# Patient Record
Sex: FEMALE | Race: WHITE | NOT HISPANIC OR LATINO | URBAN - METROPOLITAN AREA
[De-identification: names, ages, dates, MRNs, and addresses within clinical notes are randomized per-mention and may not be internally consistent; named-entity substitution may affect disease eponyms.]

---

## 2019-01-17 ENCOUNTER — APPOINTMENT (EMERGENCY)
Dept: RADIOLOGY | Facility: HOSPITAL | Age: 39
End: 2019-01-17
Attending: EMERGENCY MEDICINE
Payer: COMMERCIAL

## 2019-01-17 ENCOUNTER — HOSPITAL ENCOUNTER (EMERGENCY)
Facility: HOSPITAL | Age: 39
Discharge: HOME | End: 2019-01-17
Attending: EMERGENCY MEDICINE
Payer: COMMERCIAL

## 2019-01-17 VITALS
OXYGEN SATURATION: 98 % | SYSTOLIC BLOOD PRESSURE: 214 MMHG | TEMPERATURE: 98.7 F | RESPIRATION RATE: 18 BRPM | DIASTOLIC BLOOD PRESSURE: 104 MMHG | HEART RATE: 102 BPM

## 2019-01-17 DIAGNOSIS — E87.6 HYPOKALEMIA: ICD-10-CM

## 2019-01-17 DIAGNOSIS — R06.02 SHORTNESS OF BREATH: Primary | ICD-10-CM

## 2019-01-17 LAB
ANION GAP SERPL CALC-SCNC: 11 MEQ/L (ref 3–15)
BASOPHILS # BLD: 0.05 K/UL (ref 0.01–0.1)
BASOPHILS NFR BLD: 0.6 %
BUN SERPL-MCNC: 12 MG/DL (ref 8–20)
CALCIUM SERPL-MCNC: 8.8 MG/DL (ref 8.9–10.3)
CHLORIDE SERPL-SCNC: 105 MEQ/L (ref 98–109)
CO2 SERPL-SCNC: 20 MEQ/L (ref 22–32)
CREAT SERPL-MCNC: 0.8 MG/DL
D DIMER PPP IA.FEU-MCNC: 0.3 UG/ML FEU (ref 0–0.5)
DIFFERENTIAL METHOD BLD: ABNORMAL
EOSINOPHIL # BLD: 0.06 K/UL (ref 0.04–0.36)
EOSINOPHIL NFR BLD: 0.7 %
ERYTHROCYTE [DISTWIDTH] IN BLOOD BY AUTOMATED COUNT: 11.7 % (ref 11.7–14.4)
GFR SERPL CREATININE-BSD FRML MDRD: >60 ML/MIN/1.73M*2
GLUCOSE SERPL-MCNC: 102 MG/DL (ref 70–99)
HCG UR QL: NEGATIVE
HCT VFR BLDCO AUTO: 40.5 %
HGB BLD-MCNC: 14.6 G/DL
IMM GRANULOCYTES # BLD AUTO: 0.03 K/UL (ref 0–0.08)
IMM GRANULOCYTES NFR BLD AUTO: 0.4 %
LYMPHOCYTES # BLD: 2.17 K/UL (ref 1.2–3.5)
LYMPHOCYTES NFR BLD: 26.5 %
MCH RBC QN AUTO: 30.5 PG (ref 28–33.2)
MCHC RBC AUTO-ENTMCNC: 36 G/DL (ref 32.2–35.5)
MCV RBC AUTO: 84.7 FL (ref 83–98)
MONOCYTES # BLD: 0.66 K/UL (ref 0.28–0.8)
MONOCYTES NFR BLD: 8.1 %
NEUTROPHILS # BLD: 5.22 K/UL (ref 1.7–7)
NEUTS SEG NFR BLD: 63.7 %
NRBC BLD-RTO: 0.1 %
PDW BLD AUTO: 10.1 FL (ref 9.4–12.3)
PLATELET # BLD AUTO: 300 K/UL
POTASSIUM SERPL-SCNC: 3.1 MEQ/L (ref 3.6–5.1)
RBC # BLD AUTO: 4.78 M/UL (ref 3.93–5.22)
SODIUM SERPL-SCNC: 136 MEQ/L (ref 136–144)
TROPONIN I SERPL-MCNC: <0.02 NG/ML
WBC # BLD AUTO: 8.19 K/UL

## 2019-01-17 PROCEDURE — 93005 ELECTROCARDIOGRAM TRACING: CPT | Performed by: PHYSICIAN ASSISTANT

## 2019-01-17 PROCEDURE — 63700000 HC SELF-ADMINISTRABLE DRUG: Performed by: PHYSICIAN ASSISTANT

## 2019-01-17 PROCEDURE — 84484 ASSAY OF TROPONIN QUANT: CPT | Performed by: PHYSICIAN ASSISTANT

## 2019-01-17 PROCEDURE — 71046 X-RAY EXAM CHEST 2 VIEWS: CPT

## 2019-01-17 PROCEDURE — 36415 COLL VENOUS BLD VENIPUNCTURE: CPT | Performed by: PHYSICIAN ASSISTANT

## 2019-01-17 PROCEDURE — 80048 BASIC METABOLIC PNL TOTAL CA: CPT | Performed by: PHYSICIAN ASSISTANT

## 2019-01-17 PROCEDURE — 84703 CHORIONIC GONADOTROPIN ASSAY: CPT | Performed by: PHYSICIAN ASSISTANT

## 2019-01-17 PROCEDURE — 85379 FIBRIN DEGRADATION QUANT: CPT | Performed by: PHYSICIAN ASSISTANT

## 2019-01-17 PROCEDURE — 85025 COMPLETE CBC W/AUTO DIFF WBC: CPT | Performed by: PHYSICIAN ASSISTANT

## 2019-01-17 PROCEDURE — 99284 EMERGENCY DEPT VISIT MOD MDM: CPT | Mod: 25

## 2019-01-17 RX ORDER — ACETAMINOPHEN 500 MG
5000 TABLET ORAL DAILY
COMMUNITY

## 2019-01-17 RX ORDER — BUPROPION HYDROCHLORIDE 300 MG/1
300 TABLET ORAL DAILY
COMMUNITY

## 2019-01-17 RX ORDER — DEXTROAMPHETAMINE SACCHARATE, AMPHETAMINE ASPARTATE, DEXTROAMPHETAMINE SULFATE AND AMPHETAMINE SULFATE 2.5; 2.5; 2.5; 2.5 MG/1; MG/1; MG/1; MG/1
15 TABLET ORAL 2 TIMES DAILY
COMMUNITY

## 2019-01-17 RX ORDER — POTASSIUM CHLORIDE 750 MG/1
40 TABLET, FILM COATED, EXTENDED RELEASE ORAL ONCE
Status: COMPLETED | OUTPATIENT
Start: 2019-01-17 | End: 2019-01-17

## 2019-01-17 RX ADMIN — POTASSIUM CHLORIDE 40 MEQ: 750 TABLET, FILM COATED, EXTENDED RELEASE ORAL at 18:11

## 2019-01-17 ASSESSMENT — ENCOUNTER SYMPTOMS
PALPITATIONS: 0
BACK PAIN: 0
SHORTNESS OF BREATH: 1
FREQUENCY: 0
NECK PAIN: 0
DYSURIA: 0
VOMITING: 0
ABDOMINAL PAIN: 0
CHILLS: 1
FEVER: 0
HEADACHES: 0
DIARRHEA: 0
APPETITE CHANGE: 0
COUGH: 1
CONSTIPATION: 0
HEMATURIA: 0
NAUSEA: 0
DIFFICULTY URINATING: 0
DIZZINESS: 0
WEAKNESS: 0
LIGHT-HEADEDNESS: 0

## 2019-01-17 NOTE — ED PROVIDER NOTES
"HPI     Chief Complaint   Patient presents with   • Shortness of Breath       Patient is a 38-year-old female past medical history of ADD presenting with chief complaint of shortness of breath.  Patient states over the last 4-5 days she has been having a dry cough, feeling as if she needs to get something up however not only a small amount of yellow mucus comes up.  Patient admits to intermittent chills.  Patient states the last 2 days the shortness of breath has been causing her some current concern because she feels that it is \"hard to get a breath out\".  Patient initially thought it may be due to anxiety however admits she is feeling very happy lately and recently got a raise at work.  Patient denies back pain.  Patient also notes intermittent chest discomfort over the last couple of days.  Patient states she is concerned because her blood pressure has been a little high recently (as high as 160/110) she has taken it at a supermarket and that her PCPs office. The patient admits to OCP use with estrogen.  Patient denies history of recent travel, family or personal history of DVT/PE, recent surgery or trauma.  The patient denies calf pain or swelling.  The patient denies history of asthma or pneumonia.        History provided by:  Patient and spouse       Patient History     Past Medical History:   Diagnosis Date   • ADD (attention deficit disorder)        Past Surgical History:   Procedure Laterality Date   • ADENOIDECTOMY         History reviewed. No pertinent family history.    Social History   Substance Use Topics   • Smoking status: Never Smoker   • Smokeless tobacco: Never Used   • Alcohol use Yes      Comment: social       Systems Reviewed from Nursing Triage:          Review of Systems     Review of Systems   Constitutional: Positive for chills. Negative for appetite change and fever.   HENT: Negative for congestion.    Respiratory: Positive for cough and shortness of breath.    Cardiovascular: Positive for " chest pain. Negative for palpitations and leg swelling.   Gastrointestinal: Negative for abdominal pain, constipation, diarrhea, nausea and vomiting.   Genitourinary: Negative for decreased urine volume, difficulty urinating, dysuria, frequency, hematuria and urgency.   Musculoskeletal: Negative for back pain and neck pain.   Neurological: Negative for dizziness, weakness, light-headedness and headaches.        Physical Exam     ED Triage Vitals   Temp Heart Rate Resp BP SpO2   01/17/19 1346 01/17/19 1346 01/17/19 1635 01/17/19 1346 01/17/19 1346   37.1 °C (98.7 °F) (!) 102 18 (!) 164/90 99 %      Temp Source Heart Rate Source Patient Position BP Location FiO2 (%) (Set)   01/17/19 1346 01/17/19 1346 01/17/19 1346 01/17/19 1346 --   Tympanic Monitor Sitting Left upper arm                      Patient Vitals for the past 24 hrs:   BP Temp Temp src Pulse Resp SpO2   01/17/19 1635 (!) 195/109 - - - 18 98 %   01/17/19 1346 (!) 164/90 37.1 °C (98.7 °F) Tympanic (!) 102 - 99 %           Physical Exam   Constitutional: She is oriented to person, place, and time. She appears well-developed and well-nourished.  Non-toxic appearance. No distress.   HENT:   Head: Normocephalic and atraumatic.   Eyes: Conjunctivae and EOM are normal. Pupils are equal, round, and reactive to light.   Neck: Normal range of motion.   Cardiovascular: Regular rhythm.  Tachycardia present.    No murmur heard.  Pulmonary/Chest: Effort normal and breath sounds normal. No respiratory distress. She has no wheezes.   Abdominal: Soft. She exhibits no distension. There is no tenderness.   Musculoskeletal:        Right lower leg: She exhibits no swelling and no edema.        Left lower leg: She exhibits no swelling and no edema.   Moving all extremities without difficulty.   Neurological: She is alert and oriented to person, place, and time.   Skin: Skin is warm and dry.   Psychiatric: Her mood appears anxious.   Nursing note and vitals reviewed.            Procedures    ED Course & MDM     Labs Reviewed   BASIC METABOLIC PANEL - Abnormal        Result Value    Sodium 136      Potassium 3.1 (*)     Chloride 105      CO2 20 (*)     BUN 12      Creatinine 0.8      Glucose 102 (*)     Calcium 8.8 (*)     eGFR >60.0      Anion Gap 11     CBC - Abnormal     WBC 8.19      RBC 4.78      Hemoglobin 14.6      Hematocrit 40.5      MCV 84.7      MCH 30.5      MCHC 36.0 (*)     RDW 11.7      Platelets 300      MPV 10.1     DIFF COUNT - Abnormal     Differential Type Auto      nRBC 0.1 (*)     Immature Granulocytes 0.4      Neutrophils 63.7      Lymphocytes 26.5      Monocytes 8.1      Eosinophils 0.7      Basophils 0.6      Immature Granulocytes, Absolute 0.03      Neutrophils, Absolute 5.22      Lymphocytes, Absolute 2.17      Monocytes, Absolute 0.66      Eosinophils, Absolute 0.06      Basophils, Absolute 0.05     BHCG, SERUM, QUAL - Normal    Preg Test, Serum Negative     D-DIMER - Normal    D-Dimer, Quant 0.30     TROPONIN I - Normal    Troponin I <0.02     CBC AND DIFFERENTIAL    Narrative:     The following orders were created for panel order CBC and Differential.  Procedure                               Abnormality         Status                     ---------                               -----------         ------                     CBC[[id number]]                           Abnormal            Final result               Diff Count[[id number]]                    Abnormal            Final result                 Please view results for these tests on the individual orders.       ECG 12 lead   ED Interpretation   Regular rate at 85 bpm, normal sinus rhythm,  ms, QRS 82 ms, QT/QTc 404/480 ms, normal axis, ST/T unremarkable            X-RAY CHEST 2 VIEWS   Final Result   IMPRESSION: No active disease of the chest.                  [facility]         ED Course as of Jan 17 1802   Thu Jan 17, 2019   1622 Labs, CXR, EKG  [KM]   6833 Discussed with Dr. Rousseau  [KM]   7190 Will order  PO prior to d/c Potassium: (!) 3.1 [KM]   1750 Discussed with Dr. Rousseau. Pt can be d/c home. Pt was offered BP meds to be started due to HTN here however declines. Pt may be d/c and agrees to f/u with her PCP tomorrow.  [KM]   1757 Pt updated on all results, agrees to call her PCP tomorrow to be seen in the office.  [KM]      ED Course User Index  [KM] Kusum Carrizales PA C         Clinical Impressions as of Jan 17 1802   Shortness of breath   Hypokalemia     Dispo:  Discharge     Kusum Carrizales PA C  01/17/19 1802

## 2019-01-17 NOTE — DISCHARGE INSTRUCTIONS
You're not diagnosed with hypertension, however your blood pressure was elevated in the ER.  You must follow-up with your PCP tomorrow 1/18 regarding ER visit and elevated blood pressure.    Return to the ER if you experience worsening of symptoms, develop headache, have difficulty urinating, are unable to tolerate oral intake, develop fevers/ chills, or develop any other new concerns.

## 2019-01-18 NOTE — ED ATTESTATION NOTE
I have personally seen and examined the patient.  I reviewed and agree with physician assistant / nurse practitioner’s assessment and plan of care, with the following exceptions: None  My examination, assessment, and plan of care of Rocio Mahmood is as follows:     Patient is a 38-year-old female who presents the emergency department for evaluation of shortness of breath and high blood pressure.  Patient states for the past 4-5 days, she has been feeling very short of breath.  She has had a dry cough in association with this.  She is also concerned because her blood pressure has been elevated.  It has been elevated for the past month or so whenever it has been checked.  She had an IUD taken out was started on OCPs in October, and she is concerned that symptoms seem to have begun around this time.  She is concerned with high blood pressure is causing her self to not be able to breathe well.    On exam, the patient is awake alert and oriented.  She is very anxious.  Respirations are nonlabored.  Lungs are clear.  Heart rate is regular, not tachycardic at my exam.  Abdomen is soft, distended.  She has no lower extremity swelling or tenderness.    Workup was obtained as noted in the chart.  EKG does not any signs of arrhythmia or ischemia.  Patient is on OCPs, but is otherwise low risk for pulmonary embolism.  D-dimer was obtained and it was normal.  Remainder the patient's workup returned as noted in the chart.  Labs, chest x-ray are reassuring.  At this point in time, no clear cause for the patient's shortness of breath can be identified.  I think that she is stable for discharge and outpatient follow-up.  As the patient reports multiple elevated blood pressure readings, she was encouraged to follow-up with her PCP.  She states that she can get an appointment tomorrow.  Offered to start her on medication in the meantime, but she was just discussed with her PCP.  Discharged in stable condition     Elif Rousseau,  MD  01/17/19 4890

## 2019-01-19 LAB
ATRIAL RATE: 85
P AXIS: 4
PR INTERVAL: 144
QRS DURATION: 82
QT INTERVAL: 404
QTC CALCULATION(BAZETT): 480
R AXIS: 40
T WAVE AXIS: 20
VENTRICULAR RATE: 85

## 2023-05-24 ENCOUNTER — APPOINTMENT (RX ONLY)
Dept: URBAN - METROPOLITAN AREA CLINIC 26 | Facility: CLINIC | Age: 43
Setting detail: DERMATOLOGY
End: 2023-05-24

## 2023-05-24 DIAGNOSIS — L82.1 OTHER SEBORRHEIC KERATOSIS: ICD-10-CM

## 2023-05-24 DIAGNOSIS — D22 MELANOCYTIC NEVI: ICD-10-CM

## 2023-05-24 DIAGNOSIS — L81.4 OTHER MELANIN HYPERPIGMENTATION: ICD-10-CM

## 2023-05-24 DIAGNOSIS — D18.0 HEMANGIOMA: ICD-10-CM

## 2023-05-24 DIAGNOSIS — R20.2 PARESTHESIA OF SKIN: ICD-10-CM

## 2023-05-24 DIAGNOSIS — L72.8 OTHER FOLLICULAR CYSTS OF THE SKIN AND SUBCUTANEOUS TISSUE: ICD-10-CM

## 2023-05-24 PROBLEM — D22.5 MELANOCYTIC NEVI OF TRUNK: Status: ACTIVE | Noted: 2023-05-24

## 2023-05-24 PROBLEM — D18.01 HEMANGIOMA OF SKIN AND SUBCUTANEOUS TISSUE: Status: ACTIVE | Noted: 2023-05-24

## 2023-05-24 PROCEDURE — 99203 OFFICE O/P NEW LOW 30 MIN: CPT

## 2023-05-24 PROCEDURE — ? SUNSCREEN RECOMMENDATIONS

## 2023-05-24 PROCEDURE — ? DEFER

## 2023-05-24 PROCEDURE — ? PRESCRIPTION

## 2023-05-24 PROCEDURE — ? ADDITIONAL NOTES

## 2023-05-24 PROCEDURE — ? PRESCRIPTION MEDICATION MANAGEMENT

## 2023-05-24 PROCEDURE — ? COUNSELING

## 2023-05-24 PROCEDURE — ? FULL BODY SKIN EXAM

## 2023-05-24 RX ORDER — MUPIROCIN 20 MG/G
OINTMENT TOPICAL QDAY
Qty: 22 | Refills: 3 | Status: ERX | COMMUNITY
Start: 2023-05-24

## 2023-05-24 RX ADMIN — MUPIROCIN: 20 OINTMENT TOPICAL at 00:00

## 2023-05-24 ASSESSMENT — LOCATION SIMPLE DESCRIPTION DERM
LOCATION SIMPLE: UPPER BACK
LOCATION SIMPLE: LEFT SCALP
LOCATION SIMPLE: CHEST
LOCATION SIMPLE: RIGHT EAR

## 2023-05-24 ASSESSMENT — LOCATION DETAILED DESCRIPTION DERM
LOCATION DETAILED: SUPERIOR THORACIC SPINE
LOCATION DETAILED: RIGHT POSTAURICULAR CREASE
LOCATION DETAILED: RIGHT MEDIAL SUPERIOR CHEST
LOCATION DETAILED: LEFT MEDIAL FRONTAL SCALP

## 2023-05-24 ASSESSMENT — LOCATION ZONE DERM
LOCATION ZONE: EAR
LOCATION ZONE: SCALP
LOCATION ZONE: TRUNK

## 2023-05-24 NOTE — PROCEDURE: DEFER
Introduction Text (Please End With A Colon): The following procedure was deferred:
X Size Of Lesion In Cm (Optional): 0
Detail Level: Detailed
Procedure To Be Performed At Next Visit: Laser: Pulse dye laser 595nm

## 2023-05-24 NOTE — PROCEDURE: PRESCRIPTION MEDICATION MANAGEMENT
Initiate Treatment: mupirocin 2 % topical ointment: Apply to scalp daily
Detail Level: Zone
Render In Strict Bullet Format?: No

## 2023-05-24 NOTE — PROCEDURE: ADDITIONAL NOTES
Detail Level: Zone
Additional Notes: She requests topical abx to help lesions heal. Discussed avoidance of picking. T/c NAC in future.
Render Risk Assessment In Note?: no
Additional Notes: Excision with Plastics in future if desired

## 2024-10-08 ENCOUNTER — APPOINTMENT (RX ONLY)
Dept: URBAN - METROPOLITAN AREA CLINIC 362 | Facility: CLINIC | Age: 44
Setting detail: DERMATOLOGY
End: 2024-10-08

## 2024-10-08 DIAGNOSIS — D18.0 HEMANGIOMA: ICD-10-CM

## 2024-10-08 DIAGNOSIS — L81.4 OTHER MELANIN HYPERPIGMENTATION: ICD-10-CM

## 2024-10-08 DIAGNOSIS — D22 MELANOCYTIC NEVI: ICD-10-CM

## 2024-10-08 DIAGNOSIS — L82.1 OTHER SEBORRHEIC KERATOSIS: ICD-10-CM

## 2024-10-08 DIAGNOSIS — L28.0 LICHEN SIMPLEX CHRONICUS: ICD-10-CM | Status: INADEQUATELY CONTROLLED

## 2024-10-08 PROBLEM — D18.01 HEMANGIOMA OF SKIN AND SUBCUTANEOUS TISSUE: Status: ACTIVE | Noted: 2024-10-08

## 2024-10-08 PROBLEM — D22.5 MELANOCYTIC NEVI OF TRUNK: Status: ACTIVE | Noted: 2024-10-08

## 2024-10-08 PROCEDURE — ? PRESCRIPTION MEDICATION MANAGEMENT

## 2024-10-08 PROCEDURE — ? COUNSELING

## 2024-10-08 PROCEDURE — 99214 OFFICE O/P EST MOD 30 MIN: CPT

## 2024-10-08 PROCEDURE — ? FULL BODY SKIN EXAM

## 2024-10-08 PROCEDURE — ? SUNSCREEN RECOMMENDATIONS

## 2024-10-08 PROCEDURE — ? PRESCRIPTION

## 2024-10-08 RX ORDER — FLUOCINONIDE 0.5 MG/G
OINTMENT TOPICAL
Qty: 30 | Refills: 3 | Status: ERX | COMMUNITY
Start: 2024-10-08

## 2024-10-08 RX ADMIN — FLUOCINONIDE: 0.5 OINTMENT TOPICAL at 00:00

## 2024-10-08 ASSESSMENT — LOCATION SIMPLE DESCRIPTION DERM
LOCATION SIMPLE: LEFT SCALP
LOCATION SIMPLE: UPPER BACK

## 2024-10-08 ASSESSMENT — LOCATION ZONE DERM
LOCATION ZONE: SCALP
LOCATION ZONE: TRUNK

## 2024-10-08 ASSESSMENT — LOCATION DETAILED DESCRIPTION DERM
LOCATION DETAILED: LEFT MEDIAL FRONTAL SCALP
LOCATION DETAILED: INFERIOR THORACIC SPINE

## 2024-10-08 NOTE — PROCEDURE: PRESCRIPTION MEDICATION MANAGEMENT
Discontinue Regimen: Mupriocin 2% ointment
Initiate Treatment: fluocinonide 0.05 % topical ointment: Apply a thin layer to affected area of scalp twice daily x 3 weeks, then discontinue for 2 weeks. Repeat twice weekly as needed.
Render In Strict Bullet Format?: No
Detail Level: Detailed
Plan: Patient reports nervous picking and scratching frequently.